# Patient Record
Sex: MALE | Race: WHITE | NOT HISPANIC OR LATINO | URBAN - METROPOLITAN AREA
[De-identification: names, ages, dates, MRNs, and addresses within clinical notes are randomized per-mention and may not be internally consistent; named-entity substitution may affect disease eponyms.]

---
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
## 2025-05-28 ENCOUNTER — EMERGENCY (EMERGENCY)
Facility: HOSPITAL | Age: 20
LOS: 0 days | Discharge: HOME | End: 2025-05-28
Attending: EMERGENCY MEDICINE
Payer: COMMERCIAL

## 2025-05-28 VITALS
SYSTOLIC BLOOD PRESSURE: 137 MMHG | RESPIRATION RATE: 16 BRPM | HEART RATE: 79 BPM | TEMPERATURE: 98 F | OXYGEN SATURATION: 100 % | DIASTOLIC BLOOD PRESSURE: 80 MMHG

## 2025-05-28 DIAGNOSIS — M79.672 PAIN IN LEFT FOOT: ICD-10-CM

## 2025-05-28 DIAGNOSIS — L03.116 CELLULITIS OF LEFT LOWER LIMB: ICD-10-CM

## 2025-05-28 DIAGNOSIS — Z87.39 PERSONAL HISTORY OF OTHER DISEASES OF THE MUSCULOSKELETAL SYSTEM AND CONNECTIVE TISSUE: ICD-10-CM

## 2025-05-28 PROBLEM — Z78.9 OTHER SPECIFIED HEALTH STATUS: Chronic | Status: ACTIVE | Noted: 2019-08-25

## 2025-05-28 PROCEDURE — 99283 EMERGENCY DEPT VISIT LOW MDM: CPT | Mod: 25

## 2025-05-28 PROCEDURE — 99284 EMERGENCY DEPT VISIT MOD MDM: CPT

## 2025-05-28 PROCEDURE — 96372 THER/PROPH/DIAG INJ SC/IM: CPT

## 2025-05-28 RX ORDER — KETOROLAC TROMETHAMINE 30 MG/ML
30 INJECTION, SOLUTION INTRAMUSCULAR; INTRAVENOUS ONCE
Refills: 0 | Status: DISCONTINUED | OUTPATIENT
Start: 2025-05-28 | End: 2025-05-28

## 2025-05-28 RX ORDER — AMOXICILLIN AND CLAVULANATE POTASSIUM 500; 125 MG/1; MG/1
1 TABLET, FILM COATED ORAL
Qty: 20 | Refills: 0
Start: 2025-05-28 | End: 2025-06-06

## 2025-05-28 RX ORDER — CEPHALEXIN 250 MG/1
1 CAPSULE ORAL
Qty: 28 | Refills: 0
Start: 2025-05-28 | End: 2025-06-03

## 2025-05-28 RX ADMIN — KETOROLAC TROMETHAMINE 30 MILLIGRAM(S): 30 INJECTION, SOLUTION INTRAMUSCULAR; INTRAVENOUS at 11:43

## 2025-05-28 NOTE — ED PROVIDER NOTE - NSFOLLOWUPINSTRUCTIONS_ED_ALL_ED_FT
Cellulitis    Cellulitis is a skin infection caused by bacteria. This condition occurs most often in the arms and lower legs but can occur anywhere over the body. Symptoms include redness, swelling, warm skin, tenderness, and chills/fever. If you were prescribed an antibiotic medicine, take it as told by your health care provider. Do not stop taking the antibiotic even if you start to feel better.    SEEK IMMEDIATE MEDICAL CARE IF YOU HAVE THE FOLLOWING SYMPTOMS: worsening fever, red streaks coming from affected area, vomiting or diarrhea, or dizziness/lightheadedness.    Our Emergency Department Referral Coordinators will be reaching out to you in the next 24-48 hours from 9:00am to 5:00pm with a follow up appointment. Please expect a phone call from the hospital in that time frame. If you do not receive a call or if you have any questions or concerns, you can reach them at   [phone]

## 2025-05-28 NOTE — ED PROVIDER NOTE - OBJECTIVE STATEMENT
19 year old male with no pmhx presents to ed with left foot pain x 2 days. Pain began out of no where, had an outpt xray which regenerative degenerative disease. p denies trauma, injury, swelling or fever.

## 2025-05-28 NOTE — ED PROVIDER NOTE - PATIENT PORTAL LINK FT
You can access the FollowMyHealth Patient Portal offered by [facility] by registering at the following website: [email or web address]. By joining [employer]’s FollowMyHealth portal, you will also be able to view your health information using other applications (apps) compatible with our system.

## 2025-05-28 NOTE — ED PROVIDER NOTE - PHYSICAL EXAMINATION
GEN: Patient in no acute distress  MS: tenderness to left 5th metatarsal, Normal ROM in all extremities. pulses 2 +. no calf tenderness or swelling.  SKIN: small area of erythema noted to left 5th metatarsal, Warm, dry, no acute rashes. Good turgor  NEURO: Strength 5/5 with no sensory deficits to left foot. steady gait

## 2025-05-28 NOTE — ED ADULT TRIAGE NOTE - SOURCE OF INFORMATION
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]    Procedure : Procedure(s):  Direct laryngoscopy with  possible CO2 laser resection of vocal fold lesion          Past Medical History:   Diagnosis Date     Acute thromboembolism of deep veins of lower extremity (H)     2008,; right thigh and calf     Antiplatelet or antithrombotic long-term use      Atrial fibrillation (H)     on warfarin; CHAD2 score 0  11-10     Encounter for radiotherapy     ,ANW; Gastric      Epistaxis     10-11,10-11 left nostril stopped ASA 10-11     Esophageal reflux     No Comments Provided     Essential hypertension     No Comments Provided     History of colonic polyps     S/P removed 3-08     History of radiation therapy      Hoarseness      Hypertrophy of prostate without urinary obstruction and other lower urinary tract symptoms (LUTS)     No Comments Provided     Malignant neoplasm of stomach (H)     Stomach cancer, resected      Melanoma of skin (H)     removed L neck 4-10,removed L neck 4-10     Migraine     No Comments Provided     Nonspecific reaction to tuberculin skin test without active [place]     Other abnormal glucose     a1c  6.8  3-10     Other and unspecified hyperlipidemia     No Comments Provided     Other dyspnea and respiratory abnormality     2010,--MILD 2011     Other dyspnea and respiratory abnormality     --, ongoing worse ;     Other malaise and fatigue     2007 ONGOING,2007 ONGOING     Other pulmonary embolism and infarction     S/P filter     Other pulmonary embolism and infarction     scraped x2 (last time 3-08)     Other specified cardiac dysrhythmias     AF 40  - DCed diltiazem      Pacemaker      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      Pleural effusion     2008 and since, 2008 and since; small Left     Primary tuberculous complex     +PPD during childhood, treatment unknown     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     No Comments Provided      Past Surgical History:   Procedure Laterality Date     ARTHROPLASTY KNEE      left     COLONOSCOPY      5-10,2 tubular adenomas; due 2015     COLONOSCOPY      5-10,NO MORE NEEDED     ESOPHAGOSCOPY, GASTROSCOPY, DUODENOSCOPY (EGD), COMBINED      11/08,with dil     EXTRACAPSULAR CATARACT EXTRATION WITH INTRAOCULAR LENS IMPLANT      6-13,left     IMPLANT PACEMAKER      9-11     LARYNGOSCOPY WITH BIOPSY(IES) Left 5/28/2020    Procedure: Direct laryngoscopy with biopsy of left vocal fold;  Surgeon: Raghav Kline MD;  Location: [facility] OR     LARYNGOSCOPY WITH BIOPSY(IES) Right 6/7/2021    Procedure: mircro direct laryngoscopy with biopsy of right vocal cord lesion;  Surgeon: Raghav Kline MD;  Location: [facility] OR     LASER CO2 LARYNGOSCOPY Bilateral 2/10/2020    Procedure: Direct Laryngoscopy, Co2 laser excision of Bilateral  vocal fold lesion;  Surgeon: Raghav Kline MD;  Location: [facility] OR     LASER CO2 LARYNGOSCOPY Right 7/12/2021    Procedure: laryngoscopy with co2 laser resection of right vocal cord lesion;  Surgeon: Raghav Kline MD;  Location: [facility] OR     LASER CO2 LARYNGOSCOPY Right 8/30/2021    Procedure: LARYNGOSCOPY with CO2 laser resection of right vocal fold lesion;  Surgeon: Raghav Kline MD;  Location: [facility] OR     LASER CO2 LARYNGOSCOPY N/A 1/10/2022    Procedure: Direct laryngoscopy with biopsy and CO2 laser resection of vocal fold lesion;  Surgeon: Raghav Kline MD;  Location: [facility] OR     OTHER SURGICAL HISTORY      ,HERNIA REPAIR,right     OTHER SURGICAL HISTORY      [id number],MENISCECTOMY,left knee     OTHER SURGICAL HISTORY      [id number],SKIN BIOPSY     OTHER SURGICAL HISTORY      220080,OTHER     OTHER SURGICAL HISTORY       ,05395.0,IR VENOGRAM IVC     OTHER SURGICAL HISTORY      ,POLYPECTOMY     OTHER SURGICAL HISTORY      2008,[id number],OTHER,evin whitley     OTHER SURGICAL HISTORY      2060,CHEMOTHERAPY     OTHER SURGICAL HISTORY      2083,RADIATION TREATMENT     OTHER SURGICAL HISTORY      ,,OTHER     OTHER SURGICAL HISTORY      ,2070,CARDIOVERSION     TONSILLECTOMY      No Comments Provided      No Known Allergies   Social History     Tobacco Use     Smoking status: Former Smoker     Packs/day: 1.00     Years: 30.00     Pack years: 30.00     Types: Cigarettes     Start date: 1955     Quit date: 1/10/1996     Years since quittin.1     Smokeless tobacco: Never Used   Substance Use Topics     Alcohol use: Yes     Alcohol/week: 0.0 standard drinks     Comment: Alcoholic Drinks/day: 1/2 glass whiskey a day      Wt Readings from Last 1 Encounters:   22 68 kg (150 lb)        Anesthesia Evaluation   Pt has had prior anesthetic. Type: General.    No history of anesthetic complications       ROS/MED HX  ENT/Pulmonary:     (+) tobacco use,     Neurologic:     (+) migraines,     Cardiovascular:     (+) hypertension-----Taking blood thinners (Eliquis) pacemaker, Reason placed: bradycardia. settings: Device: Medtronic [id number] Advisa DR SLATER, - Patientt is not dependent on pacemaker. dysrhythmias (Right BBB, ), a-fib,     METS/Exercise Tolerance:     Hematologic:     (+) History of blood clots,     Musculoskeletal:       GI/Hepatic:     (+) GERD,     Renal/Genitourinary:     (+) renal disease, type: CRI, Pt does not require dialysis,     Endo:  - neg endo ROS     Psychiatric/Substance Use:  - neg psychiatric ROS     Infectious Disease:  - neg infectious disease ROS     Malignancy:   (+) Malignancy, History of Skin.    Other:               OUTSIDE LABS:  CBC:   Lab Results   Component Value Date    WBC 5.5 02/10/2020    HGB 14.2 2020    HGB 14.6 02/10/2020    HCT 44.5 02/10/2020     (L)  02/10/2020     BMP:   Lab Results   Component Value Date     (L) 07/13/2021     02/10/2020    POTASSIUM 4.2 07/13/2021    POTASSIUM 3.4 07/12/2021    CHLORIDE 98 07/13/2021    CHLORIDE 98 02/10/2020    CO2 24 07/13/2021    CO2 28 02/10/2020    BUN 29 07/13/2021    BUN 22 02/10/2020    CR 1.3 12/29/2021    CR 1.11 08/30/2021    GLC 78 01/21/2022     (H) 01/10/2022     COAGS:   Lab Results   Component Value Date    INR 1.17 (H) 08/30/2021     POC:   Lab Results   Component Value Date     (H) 06/07/2021     HEPATIC: No results found for: ALBUMIN, PROTTOTAL, ALT, AST, GGT, ALKPHOS, BILITOTAL, BILIDIRECT, VICENTE  OTHER:   Lab Results   Component Value Date    ROBERTA 8.7 07/13/2021    PHOS 4.3 07/13/2021    MAG 1.7 07/13/2021       Anesthesia Plan    ASA Status:  3   NPO Status:  NPO Appropriate    Anesthesia Type: General.     - Airway: ETT   Induction: Intravenous.   Maintenance: Balanced.   Techniques and Equipment:     - Airway: Video-Laryngoscope         Consents    Anesthesia Plan(s) and associated risks, benefits, and realistic alternatives discussed. Questions answered and patient/representative(s) expressed understanding.     - Discussed: Risks, Benefits and Alternatives for BOTH SEDATION and the PROCEDURE were discussed     - Discussed with:  Patient         Postoperative Care    Pain management: IV analgesics, Multi-modal analgesia.   PONV prophylaxis: Ondansetron (or other 5HT-3), Dexamethasone or Solumedrol     Comments:                  Constantino Hassan MD  Anesthesiology Resident, CA-1   Patient

## 2025-05-28 NOTE — ED PROVIDER NOTE - CLINICAL SUMMARY MEDICAL DECISION MAKING FREE TEXT BOX
Patient had x-rays outpatient.  At this time we will treat with antibiotics for possible cellulitis given erythema and warmth.  Patient will need outpatient follow-up with podiatry.  Will place in rapid referral system in the meantime continue with rest and NSAIDs.  Patient already has his crutches

## 2025-05-28 NOTE — ED ADULT TRIAGE NOTE - CHIEF COMPLAINT QUOTE
"Two days ago I woke up with pain to my left foot. It's been getting progressively worse." Pt advises xray indicated fragmented bone in left foot. Pt needs MRI.

## 2025-05-28 NOTE — ED PROVIDER NOTE - ATTENDING APP SHARED VISIT CONTRIBUTION OF CARE
19-year-old male presents for evaluation of left foot pain for the last 2 days.  No history of trauma.  Patient was seen in urgent care and had an x-ray that showed  degenerative changes, no fever or chills, on exam patient in NAD, AAO x 3, positive small area of erythema and swelling noted over lying left lateral foot, positive distal pulses, no streaking, ankle nontender

## 2025-05-28 NOTE — ED ADULT NURSE NOTE - NSFALLUNIVINTERV_ED_ALL_ED
Bed/Stretcher in lowest position, wheels locked, appropriate side rails in place/Call bell, personal items and telephone in reach/Instruct patient to call for assistance before getting out of bed/chair/stretcher/Non-slip footwear applied when patient is off stretcher/Alvin to call system/Physically safe environment - no spills, clutter or unnecessary equipment/Purposeful proactive rounding/Room/bathroom lighting operational, light cord in reach